# Patient Record
Sex: FEMALE | Race: WHITE | NOT HISPANIC OR LATINO | URBAN - METROPOLITAN AREA
[De-identification: names, ages, dates, MRNs, and addresses within clinical notes are randomized per-mention and may not be internally consistent; named-entity substitution may affect disease eponyms.]

---

## 2018-02-08 ENCOUNTER — EMERGENCY (EMERGENCY)
Facility: HOSPITAL | Age: 38
LOS: 0 days | Discharge: HOME | End: 2018-02-08
Attending: EMERGENCY MEDICINE

## 2018-02-08 VITALS
HEART RATE: 81 BPM | RESPIRATION RATE: 18 BRPM | DIASTOLIC BLOOD PRESSURE: 62 MMHG | SYSTOLIC BLOOD PRESSURE: 133 MMHG | OXYGEN SATURATION: 99 % | TEMPERATURE: 98 F

## 2018-02-08 DIAGNOSIS — Z02.9 ENCOUNTER FOR ADMINISTRATIVE EXAMINATIONS, UNSPECIFIED: ICD-10-CM

## 2018-02-08 DIAGNOSIS — K04.7 PERIAPICAL ABSCESS WITHOUT SINUS: ICD-10-CM

## 2018-02-08 RX ORDER — KETOROLAC TROMETHAMINE 30 MG/ML
30 SYRINGE (ML) INJECTION ONCE
Qty: 0 | Refills: 0 | Status: DISCONTINUED | OUTPATIENT
Start: 2018-02-08 | End: 2018-02-08

## 2018-02-08 RX ADMIN — Medication 30 MILLIGRAM(S): at 21:42

## 2018-02-08 RX ADMIN — Medication 1 TABLET(S): at 22:56

## 2018-02-08 NOTE — ED PROVIDER NOTE - ATTENDING CONTRIBUTION TO CARE
37 yr F with hx of CVA, seizure d/o with complaints of left facial pain and swelling for several days. Pt with hx of dental caries. On exam + swelling of the left face with fluctuance of the left lower molar teeth. Will call dental for management of a likely dental abscess and dispo accordingly. I personally evaluated the patient. I reviewed the Resident’s or Physician Assistant’s note (as assigned above), and agree with the findings and plan except as documented in my note.  37 yr F with hx of CVA, seizure d/o with complaints of left facial pain and swelling for several days. Pt with hx of dental caries. On exam + swelling of the left face with fluctuance of the left lower molar teeth. Will call dental for management of a likely dental abscess and dispo accordingly.

## 2018-02-08 NOTE — CONSULT NOTE ADULT - ASSESSMENT
upper left infiltration with mepivacaine and septocaine for area of superior alveolar nerve and posterior and anterior.  incision and drainage completed. blood and pus came thru the the buccal fold pocket .  Drain sutured in place with silk 3-0.   patient comfortable most of the time.

## 2018-02-08 NOTE — CONSULT NOTE ADULT - SUBJECTIVE AND OBJECTIVE BOX
Patient presented with moderate swelling upper left area of #11 and has several missing teeth posterior to this one.   slight pain started yesterday and day before ; today at morning hour she wake up with slight swelling and during the day has progressed.   no fever, no diziness, slight headaches.

## 2018-02-08 NOTE — ED PROVIDER NOTE - NS ED ROS FT
Eyes:  No visual changes, eye pain or discharge.  ENMT:  + dental pain  Cardiac:  No chest pain, SOB or edema. No chest pain with exertion.  Respiratory:  No cough or respiratory distress. No hemoptysis. No history of asthma or RAD.  MS:  No myalgia, muscle weakness, joint pain or back pain.  Neuro:  No headache or weakness.  No LOC.  Skin:  No skin rash.   Endocrine: No history of thyroid disease or diabetes.  Except as documented in the HPI,  all other systems are negative.

## 2018-02-08 NOTE — ED PROVIDER NOTE - OBJECTIVE STATEMENT
Pt is a 38y/o female presents today c/o left sided dental pain with associated elft sided facial swelling x 1 day. Pt denies drooling, difficulty swallowing, SOB, CP, Fever, chills.

## 2018-02-08 NOTE — CONSULT NOTE ADULT - PROBLEM SELECTOR RECOMMENDATION 9
recommended : augmenting and naproxen prescription, soft and cold diet  must return to dental clinic tomorrow at 8:30 .

## 2018-02-08 NOTE — ED PROVIDER NOTE - MEDICAL DECISION MAKING DETAILS
pt has dental abscess startedon augmentin and give naproxen for pain relief, instructed to f/u with dentla clinic tomorrow

## 2018-02-08 NOTE — ED PROVIDER NOTE - PROGRESS NOTE DETAILS
dental aware and will see pt dental resident drained abscess and placed a drain. Pt to see them in the clinic in the am. Pt will be started on Augmentin. seen by dental, drained place, pt told to fu with dental tomorrow, augmentin and naproxen will be sent to pharmacy

## 2018-02-08 NOTE — ED PROVIDER NOTE - PHYSICAL EXAMINATION
VITAL SIGNS: I have reviewed nursing notes and confirm.  CONSTITUTIONAL: Well-developed; well-nourished; in no acute distress.   SKIN:  skin exam is warm and dry, no acute rash.    HEAD: Normocephalic; atraumatic.  EYES: conjunctiva and sclera clear.  ENT: left sided facial swelling, TTP tooth #11, no sublingual swelling, no airway obstruction No nasal discharge; airway clear.  NECK: Supple; non tender.  CARD: S1, S2 normal; no murmurs, gallops, or rubs. Regular rate and rhythm.   RESP: No wheezes, rales or rhonchi.  EXT: Normal ROM.  No clubbing, cyanosis or edema.   LYMPH: No acute cervical adenopathy.  NEURO: Alert, oriented, grossly unremarkable

## 2018-02-09 ENCOUNTER — OUTPATIENT (OUTPATIENT)
Dept: OUTPATIENT SERVICES | Facility: HOSPITAL | Age: 38
LOS: 1 days | Discharge: HOME | End: 2018-02-09

## 2018-02-09 ENCOUNTER — INPATIENT (INPATIENT)
Facility: HOSPITAL | Age: 38
LOS: 2 days | Discharge: HOME | End: 2018-02-12
Attending: HOSPITALIST

## 2018-02-09 VITALS
TEMPERATURE: 98 F | OXYGEN SATURATION: 98 % | HEART RATE: 726 BPM | DIASTOLIC BLOOD PRESSURE: 81 MMHG | SYSTOLIC BLOOD PRESSURE: 136 MMHG | RESPIRATION RATE: 20 BRPM

## 2018-02-09 DIAGNOSIS — K04.7 PERIAPICAL ABSCESS WITHOUT SINUS: ICD-10-CM

## 2018-02-09 DIAGNOSIS — K02.53 DENTAL CARIES ON PIT AND FISSURE SURFACE PENETRATING INTO PULP: ICD-10-CM

## 2018-02-09 DIAGNOSIS — Z98.51 TUBAL LIGATION STATUS: Chronic | ICD-10-CM

## 2018-02-09 DIAGNOSIS — Z98.890 OTHER SPECIFIED POSTPROCEDURAL STATES: Chronic | ICD-10-CM

## 2018-02-09 LAB
ANION GAP SERPL CALC-SCNC: 8 MMOL/L — SIGNIFICANT CHANGE UP (ref 7–14)
BUN SERPL-MCNC: 6 MG/DL — LOW (ref 10–20)
CALCIUM SERPL-MCNC: 9.7 MG/DL — SIGNIFICANT CHANGE UP (ref 8.5–10.1)
CHLORIDE SERPL-SCNC: 106 MMOL/L — SIGNIFICANT CHANGE UP (ref 98–110)
CO2 SERPL-SCNC: 28 MMOL/L — SIGNIFICANT CHANGE UP (ref 17–32)
CREAT SERPL-MCNC: 0.8 MG/DL — SIGNIFICANT CHANGE UP (ref 0.7–1.5)
GLUCOSE SERPL-MCNC: 86 MG/DL — SIGNIFICANT CHANGE UP (ref 70–110)
HCT VFR BLD CALC: 40.6 % — SIGNIFICANT CHANGE UP (ref 37–47)
HGB BLD-MCNC: 13.6 G/DL — LOW (ref 14–18)
LACTATE SERPL-SCNC: 2 MMOL/L — SIGNIFICANT CHANGE UP (ref 0.5–2.2)
MCHC RBC-ENTMCNC: 31.4 PG — HIGH (ref 27–31)
MCHC RBC-ENTMCNC: 33.5 G/DL — SIGNIFICANT CHANGE UP (ref 32–37)
MCV RBC AUTO: 93.8 FL — HIGH (ref 81–91)
NRBC # BLD: 0 /100 WBCS — SIGNIFICANT CHANGE UP (ref 0–0)
PLATELET # BLD AUTO: 220 K/UL — SIGNIFICANT CHANGE UP (ref 130–400)
POTASSIUM SERPL-MCNC: 4.4 MMOL/L — SIGNIFICANT CHANGE UP (ref 3.5–5)
POTASSIUM SERPL-SCNC: 4.4 MMOL/L — SIGNIFICANT CHANGE UP (ref 3.5–5)
RBC # BLD: 4.33 M/UL — SIGNIFICANT CHANGE UP (ref 4.2–5.4)
RBC # FLD: 11.9 % — SIGNIFICANT CHANGE UP (ref 11.5–14.5)
SODIUM SERPL-SCNC: 142 MMOL/L — SIGNIFICANT CHANGE UP (ref 135–146)
WBC # BLD: 7.72 K/UL — SIGNIFICANT CHANGE UP (ref 4.8–10.8)
WBC # FLD AUTO: 7.72 K/UL — SIGNIFICANT CHANGE UP (ref 4.8–10.8)

## 2018-02-09 RX ORDER — AMPICILLIN SODIUM AND SULBACTAM SODIUM 250; 125 MG/ML; MG/ML
3 INJECTION, POWDER, FOR SUSPENSION INTRAMUSCULAR; INTRAVENOUS ONCE
Qty: 0 | Refills: 0 | Status: COMPLETED | OUTPATIENT
Start: 2018-02-09 | End: 2018-02-09

## 2018-02-09 RX ORDER — ALBUTEROL 90 UG/1
2 AEROSOL, METERED ORAL EVERY 6 HOURS
Qty: 0 | Refills: 0 | Status: DISCONTINUED | OUTPATIENT
Start: 2018-02-09 | End: 2018-02-12

## 2018-02-09 RX ORDER — AMPICILLIN SODIUM AND SULBACTAM SODIUM 250; 125 MG/ML; MG/ML
INJECTION, POWDER, FOR SUSPENSION INTRAMUSCULAR; INTRAVENOUS
Qty: 0 | Refills: 0 | Status: DISCONTINUED | OUTPATIENT
Start: 2018-02-09 | End: 2018-02-12

## 2018-02-09 RX ORDER — MORPHINE SULFATE 50 MG/1
4 CAPSULE, EXTENDED RELEASE ORAL ONCE
Qty: 0 | Refills: 0 | Status: DISCONTINUED | OUTPATIENT
Start: 2018-02-09 | End: 2018-02-09

## 2018-02-09 RX ORDER — SODIUM CHLORIDE 9 MG/ML
1000 INJECTION, SOLUTION INTRAVENOUS
Qty: 0 | Refills: 0 | Status: DISCONTINUED | OUTPATIENT
Start: 2018-02-09 | End: 2018-02-12

## 2018-02-09 RX ORDER — AMPICILLIN SODIUM AND SULBACTAM SODIUM 250; 125 MG/ML; MG/ML
3 INJECTION, POWDER, FOR SUSPENSION INTRAMUSCULAR; INTRAVENOUS EVERY 6 HOURS
Qty: 0 | Refills: 0 | Status: DISCONTINUED | OUTPATIENT
Start: 2018-02-10 | End: 2018-02-12

## 2018-02-09 RX ORDER — SODIUM CHLORIDE 9 MG/ML
1000 INJECTION INTRAMUSCULAR; INTRAVENOUS; SUBCUTANEOUS
Qty: 0 | Refills: 0 | Status: DISCONTINUED | OUTPATIENT
Start: 2018-02-09 | End: 2018-02-09

## 2018-02-09 RX ORDER — ALBUTEROL 90 UG/1
2 AEROSOL, METERED ORAL
Qty: 0 | Refills: 0 | COMMUNITY

## 2018-02-09 RX ORDER — IBUPROFEN 200 MG
400 TABLET ORAL EVERY 6 HOURS
Qty: 0 | Refills: 0 | Status: DISCONTINUED | OUTPATIENT
Start: 2018-02-09 | End: 2018-02-12

## 2018-02-09 RX ADMIN — AMPICILLIN SODIUM AND SULBACTAM SODIUM 200 GRAM(S): 250; 125 INJECTION, POWDER, FOR SUSPENSION INTRAMUSCULAR; INTRAVENOUS at 15:56

## 2018-02-09 RX ADMIN — AMPICILLIN SODIUM AND SULBACTAM SODIUM 200 GRAM(S): 250; 125 INJECTION, POWDER, FOR SUSPENSION INTRAMUSCULAR; INTRAVENOUS at 21:49

## 2018-02-09 RX ADMIN — MORPHINE SULFATE 4 MILLIGRAM(S): 50 CAPSULE, EXTENDED RELEASE ORAL at 16:01

## 2018-02-09 RX ADMIN — SODIUM CHLORIDE 500 MILLILITER(S): 9 INJECTION INTRAMUSCULAR; INTRAVENOUS; SUBCUTANEOUS at 15:45

## 2018-02-09 RX ADMIN — SODIUM CHLORIDE 75 MILLILITER(S): 9 INJECTION, SOLUTION INTRAVENOUS at 23:48

## 2018-02-09 RX ADMIN — AMPICILLIN SODIUM AND SULBACTAM SODIUM 200 GRAM(S): 250; 125 INJECTION, POWDER, FOR SUSPENSION INTRAMUSCULAR; INTRAVENOUS at 23:47

## 2018-02-09 RX ADMIN — MORPHINE SULFATE 4 MILLIGRAM(S): 50 CAPSULE, EXTENDED RELEASE ORAL at 16:30

## 2018-02-09 RX ADMIN — SODIUM CHLORIDE 500 MILLILITER(S): 9 INJECTION INTRAMUSCULAR; INTRAVENOUS; SUBCUTANEOUS at 15:40

## 2018-02-09 NOTE — H&P ADULT - ATTENDING COMMENTS
I saw and examined the patient at bedside.   She is 38 yo female with PMH of Asthma and Seizure, she started with left facial swelling and pain for two days, also she had dental pain, she went to her dentist and she had one tooth extraction, she was started on oral antibiotics but yesterday she woke up and found the swelling around her left eye so she came to Ed, she denies fever, no trauma, no nausea or vomiting, no double vision, no headache or seizure. in the ED her vital signs were stable, labs unremarkable, patient was admitted for dental abscess for IV antibiotics,    Physical exam:   alert oriented x 3 in no distress  VS noted  HEENT: left facial edema, swelling and tenderness with periorbital edema in left side, no collection.   Neck: no JVD  Chest; clear, no rales or wheezing  Heart: RRR s1 s2 no murmurs.   Abdomen: soft, non-tender. no megalies.   ext: no edema    A/P:   Dental abscess: didn't respond to oral antibiotics.   risk of infection expansion to orbital space.   continue Unasyn IV 3.5g q 6hrs  Oral surgery consult.   blood culture   monitor for worsening symptoms, may need orbital CT to rule out any abbesses or collection. ( less likely now, no fever, leukocytosis) I saw and examined the patient at bedside.   She is 38 yo female with PMH of Asthma and Seizure, she started with left facial swelling and pain for two days, also she had dental pain, she woke up and found the swelling around her left eye so she came to Ed, she was discharged on oral antibiotics, then she visited her dentist today and she had the upper 1st molar extraction and referred to ED for IV antibiotics , she denies fever, no trauma, no nausea or vomiting, no double vision, no headache or seizure. in the ED her vital signs were stable, labs unremarkable, patient was admitted for dental abscess for IV antibiotics,    Physical exam:   alert oriented x 3 in no distress  VS noted  HEENT: left facial edema, swelling and tenderness with periorbital edema in left side, no collection.   Neck: no JVD  Chest; clear, no rales or wheezing  Heart: RRR s1 s2 no murmurs.   Abdomen: soft, non-tender. no megalies.   ext: no edema    A/P:   Dental abscess: didn't respond to oral antibiotics.   risk of infection expansion to orbital space.   continue Unasyn IV 3.5g q 6hrs  Oral surgery consult.   blood culture   monitor for worsening symptoms, may need orbital CT to rule out any abbesses or collection. ( less likely now, no fever, leukocytosis) I saw and examined the patient at bedside.   She is 36 yo female with PMH of Asthma and Seizure, she started with left facial swelling and pain for two days, also she had dental pain, she woke up and found the swelling around her left eye so she came to Ed, she was discharged on oral antibiotics, then she visited her dentist today and she had the upper 1st molar extraction and referred to ED for IV antibiotics , she denies fever, no trauma, no nausea or vomiting, no double vision, no headache or seizure. in the ED her vital signs were stable, labs unremarkable, patient was admitted for dental abscess for IV antibiotics,    Physical exam:   alert oriented x 3 in no distress  VS noted  HEENT: left facial edema, swelling and tenderness with periorbital edema in left side, no collection.   Neck: no JVD  Chest; clear, no rales or wheezing  Heart: RRR s1 s2 no murmurs.   Abdomen: soft, non-tender. no megalies.   ext: no edema    A/P:   Dental abscess: didn't respond to oral antibiotics.   risk of infection expansion to orbital space.   continue Unasyn IV 3.5g q 6hrs  Oral surgery consult.   blood culture   monitor for worsening symptoms, may need orbital CT to rule out any abbesses or collection. ( less likely now, no fever, leukocytosis)  Dr. Olivares :   Patient was evaluated and examined by bedside, All labs, radiology studies, VS was reviewed  I agree with medical plan outlined by Medical Attending as stated above.

## 2018-02-09 NOTE — H&P ADULT - NSHPPHYSICALEXAM_GEN_ALL_CORE
Patient is not in respiratory distress, protecting her airways, non-septic.    HEENT: +ve left facial swelling, periorbital swelling, +ve tenderness, +ve palpable LNs  poor oral hygiene, incision of the left upper gingiva(no bleeding). no pharyngeal erythema or exudate of the tonsils.  no conjuctival erythema, or purulent drainage.    Lungs: CTA B/L  CV: RRR, no Rubs, Murmurs, or Gallops.  Abdomen: Soft, NT,ND.  LE: no pitting edema or erythema.  Neuro : AAox3, left hand(weakness no change as / patient), PERRLA.

## 2018-02-09 NOTE — H&P ADULT - PROBLEM SELECTOR PLAN 1
S/p I&D: Oral care, pain control (hurricane soln/naproxen).  iv unasyn.   dental revaluation/ f/u as outpt.

## 2018-02-09 NOTE — ED PROVIDER NOTE - OBJECTIVE STATEMENT
37 female here for dental pain and abscess brought to ED by Dental clinic after I&D of her left teeth today. Has symptoms x seeral days, was in the ED yesterady for same, now worse. Radiation to left face. No provoking / palliative factors. 37 female here for dental pain and abscess brought to ED by Dental clinic after I&D of her left teeth today. Has symptoms x several days, was in the ED yesterday for same, now worse. Radiation to left face. No provoking / palliative factors.

## 2018-02-09 NOTE — H&P ADULT - HISTORY OF PRESENT ILLNESS
37 years-old lady with pmhx as listed came in with left facial swelling that started 2 days ago. Patient facial swelling is associated with mild facial pain, orbital swelling and dental pain. patient came in to the ED today and had an I&D in the dental clinic of her left upper 1st molar tooth and was sent for IV abx. patient denied any nausea, vomiting, fever, rigors, SOB, chills, dysphagia, odynophagia, sore throat, drooling, hoarseness of voice, neck pain, cough, recent head trauma. patient came in to the ED yesterday and was discharged on amoxiclav/ naproxen

## 2018-02-09 NOTE — H&P ADULT - ASSESSMENT
37 years-old came in with left facial swelling of 2 days duration likely 2ry to dental infection/abscess of left upper molar tooth s/p I&D in the dental clinic today

## 2018-02-09 NOTE — ED PROVIDER NOTE - PHYSICAL EXAMINATION
HEENT: left facial swelling noted with periorbital edema. Left jaw and left nare swollen. Left lip swollen.

## 2018-02-09 NOTE — H&P ADULT - NSHPLABSRESULTS_GEN_ALL_CORE
13.6   7.72  )-----------( 220      ( 09 Feb 2018 15:16 )             40.6           LFTs: 02-09-18 @ 15:16  Ca  9.7  | -- AST   -----------------  TP  --  | -- ALT  -----------------  Alb --  | -- ALK          ^        --         TB      BMP: 02-09-18 @ 15:16  142 | 106 | 6    -----------------< 86  4.4  | 28 | 0.8  eGFR(AA): 112, eGFR (non-AA): 97  Ca 9.7, Mg --, P --

## 2018-02-09 NOTE — ED PROVIDER NOTE - NS ED ROS FT
Review of Systems:  	•	CONSTITUTIONAL - no fever, no diaphoresis, no weight change  	•	SKIN - no rash  	•	EYES - no eye pain, no blurred vision + facial swelling.   	•	ENT - no change in hearing, + left facial swelling.   	•	RESPIRATORY - no shortness of breath, no cough  	•	CARDIAC - no chest pain, no palpitations  	•	GI - no abd pain, no nausea, no vomiting  	•	MUSCULOSKELETAL - no joint pain  	•	NEUROLOGIC - no weakness, no headache, no anesthesia, no paresthesias  	•	PSYCH - no anxiety, non suicidal, non homicidal

## 2018-02-10 RX ORDER — OXYCODONE AND ACETAMINOPHEN 5; 325 MG/1; MG/1
1 TABLET ORAL EVERY 6 HOURS
Qty: 0 | Refills: 0 | Status: DISCONTINUED | OUTPATIENT
Start: 2018-02-10 | End: 2018-02-12

## 2018-02-10 RX ADMIN — Medication 400 MILLIGRAM(S): at 12:57

## 2018-02-10 RX ADMIN — OXYCODONE AND ACETAMINOPHEN 1 TABLET(S): 5; 325 TABLET ORAL at 22:43

## 2018-02-10 RX ADMIN — OXYCODONE AND ACETAMINOPHEN 1 TABLET(S): 5; 325 TABLET ORAL at 23:00

## 2018-02-10 RX ADMIN — AMPICILLIN SODIUM AND SULBACTAM SODIUM 200 GRAM(S): 250; 125 INJECTION, POWDER, FOR SUSPENSION INTRAMUSCULAR; INTRAVENOUS at 17:27

## 2018-02-10 RX ADMIN — OXYCODONE AND ACETAMINOPHEN 1 TABLET(S): 5; 325 TABLET ORAL at 15:32

## 2018-02-10 RX ADMIN — Medication 400 MILLIGRAM(S): at 12:05

## 2018-02-10 RX ADMIN — AMPICILLIN SODIUM AND SULBACTAM SODIUM 200 GRAM(S): 250; 125 INJECTION, POWDER, FOR SUSPENSION INTRAMUSCULAR; INTRAVENOUS at 05:02

## 2018-02-10 RX ADMIN — OXYCODONE AND ACETAMINOPHEN 1 TABLET(S): 5; 325 TABLET ORAL at 17:24

## 2018-02-10 RX ADMIN — AMPICILLIN SODIUM AND SULBACTAM SODIUM 200 GRAM(S): 250; 125 INJECTION, POWDER, FOR SUSPENSION INTRAMUSCULAR; INTRAVENOUS at 12:55

## 2018-02-10 RX ADMIN — SODIUM CHLORIDE 75 MILLILITER(S): 9 INJECTION, SOLUTION INTRAVENOUS at 22:37

## 2018-02-11 RX ADMIN — AMPICILLIN SODIUM AND SULBACTAM SODIUM 200 GRAM(S): 250; 125 INJECTION, POWDER, FOR SUSPENSION INTRAMUSCULAR; INTRAVENOUS at 17:57

## 2018-02-11 RX ADMIN — SODIUM CHLORIDE 75 MILLILITER(S): 9 INJECTION, SOLUTION INTRAVENOUS at 14:33

## 2018-02-11 RX ADMIN — AMPICILLIN SODIUM AND SULBACTAM SODIUM 200 GRAM(S): 250; 125 INJECTION, POWDER, FOR SUSPENSION INTRAMUSCULAR; INTRAVENOUS at 00:36

## 2018-02-11 RX ADMIN — OXYCODONE AND ACETAMINOPHEN 1 TABLET(S): 5; 325 TABLET ORAL at 21:30

## 2018-02-11 RX ADMIN — OXYCODONE AND ACETAMINOPHEN 1 TABLET(S): 5; 325 TABLET ORAL at 20:56

## 2018-02-11 RX ADMIN — OXYCODONE AND ACETAMINOPHEN 1 TABLET(S): 5; 325 TABLET ORAL at 16:40

## 2018-02-11 RX ADMIN — AMPICILLIN SODIUM AND SULBACTAM SODIUM 200 GRAM(S): 250; 125 INJECTION, POWDER, FOR SUSPENSION INTRAMUSCULAR; INTRAVENOUS at 13:40

## 2018-02-11 RX ADMIN — AMPICILLIN SODIUM AND SULBACTAM SODIUM 200 GRAM(S): 250; 125 INJECTION, POWDER, FOR SUSPENSION INTRAMUSCULAR; INTRAVENOUS at 06:47

## 2018-02-11 RX ADMIN — OXYCODONE AND ACETAMINOPHEN 1 TABLET(S): 5; 325 TABLET ORAL at 13:39

## 2018-02-11 NOTE — CONSULT NOTE ADULT - ASSESSMENT
Patient was presented to emergency room Thursday night with a large abscess swelling of the upper left side that occurred over night with numbness and difficulty opening her left eye. The on call dental resident Thursday performed an I and D and placed a drain. Patient was discharged and patient came to the dental clinic as an emergency Friday. The drain was removed, an emergency exam was done, and the infected tooth was extracted. Patient was admitted for monitoring due to the extent of the infection and swelling. Today, clinically the patient's swelling has improved greatly since Friday and extraction site is healing well. Face and eye is almost back to normal with little sign of swelling. There were no abnormal findings or signs of new infection. Patient states no pain, no fever, no more numbness, or any new problems since the extraction. Patient was presented to emergency room Thursday night with a large abscess swelling of the upper left side that occurred over night with numbness and difficulty opening her left eye. The on call dental resident Thursday performed an I and D and placed a drain on the upper left buccal gingiva. Patient was discharged and patient came to the dental clinic as an emergency Friday. The drain was removed, an emergency exam was done, and the infected tooth of the upper left was extracted. Patient was admitted for IV antibiotics and monitoring due to the extent of the infection and swelling. Today, clinically the patient's swelling has improved greatly since Friday and extraction site is healing well. Face and eye is nearly back to normal with little sign of swelling. There were no abnormal findings or signs of new infection. Patient states no pain, no fever, no more numbness, or any new problems since the extraction.

## 2018-02-11 NOTE — PROGRESS NOTE ADULT - ATTENDING COMMENTS
Patient was evaluated and examined by bedside,  All labs, radiology studies, VS was reviewed  I agree with medical plan outlined by Medical Attending as stated above.

## 2018-02-11 NOTE — PROGRESS NOTE ADULT - ASSESSMENT
37 F w/ PMH of Asthma and Seizure, p/w L facial swelling and pain for two days, also she had dental pain, she woke up and found the swelling around her left eye so she came to Ed, she was discharged on oral antibiotics, then she visited her dentist on 2/9 and had the upper 1st molar extraction and referred to ED for IV antibiotics , she was admitted for dental abscess for IV antibiotics.
Dental abscess: improved  seen by dentisi   possible discharge home tomorrow with oral Augmentin.

## 2018-02-11 NOTE — CONSULT NOTE ADULT - PROBLEM SELECTOR RECOMMENDATION 9
Continue antibiotics and discharge with amoxicillin 500 mg every 8 hours for 7 days.  Comprehensive exam with private dentist as an outpatient. Continue antibiotics.  Comprehensive exam with private dentist as an outpatient.

## 2018-02-11 NOTE — PROGRESS NOTE ADULT - SUBJECTIVE AND OBJECTIVE BOX
CAROLYN WEAVER  37y  Female      Patient is a 37y old  Female who presents with a chief complaint of Lt jaw clinic  abscess (09 Feb 2018 19:54)      INTERVAL HPI/OVERNIGHT EVENTS:  patient feels much better, her facial swelling has improved, no fever.     Vital Signs Last 24 Hrs  T(C): 36.8 (11 Feb 2018 05:29), Max: 36.8 (11 Feb 2018 05:29)  T(F): 98.2 (11 Feb 2018 05:29), Max: 98.2 (11 Feb 2018 05:29)  HR: 58 (11 Feb 2018 05:29) (57 - 58)  BP: 106/55 (11 Feb 2018 05:29) (106/55 - 111/53)  BP(mean): --  RR: 16 (11 Feb 2018 05:29) (16 - 17)  SpO2: --            Consultant(s) Notes Reviewed:  [x ] YES  [ ] NO          MEDICATIONS  (STANDING):  ampicillin/sulbactam  IVPB 3 Gram(s) IV Intermittent every 6 hours  ampicillin/sulbactam  IVPB      lactated ringers. 1000 milliLiter(s) (75 mL/Hr) IV Continuous <Continuous>    MEDICATIONS  (PRN):  ALBUTerol    90 MICROgram(s) HFA Inhaler 2 Puff(s) Inhalation every 6 hours PRN Bronchospasm  ibuprofen  Tablet 400 milliGRAM(s) Oral every 6 hours PRN moderate pain  oxyCODONE    5 mG/acetaminophen 325 mG 1 Tablet(s) Oral every 6 hours PRN Severe Pain (7 - 10)      LABS                          13.6   7.72  )-----------( 220      ( 09 Feb 2018 15:16 )             40.6     02-09    142  |  106  |  6<L>  ----------------------------<  86  4.4   |  28  |  0.8    Ca    9.7      09 Feb 2018 15:16            Lactate Trend  02-09 @ 15:16 Lactate:2.0         CAPILLARY BLOOD GLUCOSE            RADIOLOGY & ADDITIONAL TESTS:    Imaging Personally Reviewed:  [ ] YES  [ ] NO    HEALTH ISSUES - PROBLEM Dx:  Dental abscess: Dental abscess
SUBJECTIVE:    Patient is a 37y old Female who presents with a chief complaint of Lt jaw abscess  Currently admitted to medicine with the primary diagnosis of Dental abscess     Today is hospital day 1d. This morning she is resting comfortably in bed and complains of pain, and that ibuprofen isn't controlling the pain adequately.    PAST MEDICAL & SURGICAL HISTORY  Stroke:   Asthma  Seizure  H/O tubal ligation  Delivered by  section  H/O hernia repair    SOCIAL HISTORY:  Negative for smoking/alcohol/drug use.     ALLERGIES:  Neurontin (Hives; Urticaria)  Tegretol (Hives; Urticaria)    MEDICATIONS:  STANDING MEDICATIONS  ampicillin/sulbactam  IVPB 3 Gram(s) IV Intermittent every 6 hours  ampicillin/sulbactam  IVPB      lactated ringers. 1000 milliLiter(s) IV Continuous <Continuous>    PRN MEDICATIONS  ALBUTerol    90 MICROgram(s) HFA Inhaler 2 Puff(s) Inhalation every 6 hours PRN  ibuprofen  Tablet 400 milliGRAM(s) Oral every 6 hours PRN  oxyCODONE    5 mG/acetaminophen 325 mG 1 Tablet(s) Oral every 6 hours PRN    VITALS:   T(F): 97.6  HR: 54  BP: 119/61  RR: 18  SpO2: --    LABS:                        13.6   7.72  )-----------( 220      ( 2018 15:16 )             40.6     02-    142  |  106  |  6<L>  ----------------------------<  86  4.4   |  28  |  0.8    Ca    9.7      2018 15:16        PHYSICAL EXAM:     HEENT: left facial edema, swelling and tenderness with periorbital edema in left side, no collection.   	           poor oral hygiene, incision of the left upper gingiva (no bleeding). no pharyngeal erythema or exudate of the tonsils.  	           no conjuctival erythema, or purulent drainage.  Lungs: CTA B/L  CV: RRR, no Rubs, Murmurs, or Gallops.  Abdomen: Soft, NT,ND.  LE: no pitting edema or erythema.  Neuro : AAox3, left hand(weakness no change as / patient), PERRLA.

## 2018-02-11 NOTE — CONSULT NOTE ADULT - SUBJECTIVE AND OBJECTIVE BOX
CAROLYN WEAVER, 37y, Female    T(C): 36.8 (18 @ 05:29), Max: 36.8 (18 @ 05:29)  HR: 58 (18 @ 05:29) (54 - 58)  BP: 106/55 (18 @ 05:29) (106/55 - 119/61)  RR: 16 (18 @ 05:29) (16 - 18)  SpO2: --    DENTAL ABSCESS  No h/o HF  No pertinent family history in first degree relatives  Handoff  MEWS Score  Stroke  Asthma  Seizure  Dental abscess  H/O tubal ligation  Delivered by  section  H/O hernia repair    Neurontin (Hives; Urticaria)  Tegretol (Hives; Urticaria)    ALBUTerol    90 MICROgram(s) HFA Inhaler 2 Puff(s) Inhalation every 6 hours PRN  ampicillin/sulbactam  IVPB 3 Gram(s) IV Intermittent every 6 hours  ampicillin/sulbactam  IVPB      ibuprofen  Tablet 400 milliGRAM(s) Oral every 6 hours PRN  lactated ringers. 1000 milliLiter(s) IV Continuous <Continuous>  oxyCODONE    5 mG/acetaminophen 325 mG 1 Tablet(s) Oral every 6 hours PRN

## 2018-02-12 VITALS
TEMPERATURE: 98 F | DIASTOLIC BLOOD PRESSURE: 46 MMHG | HEART RATE: 50 BPM | RESPIRATION RATE: 18 BRPM | SYSTOLIC BLOOD PRESSURE: 104 MMHG

## 2018-02-12 DIAGNOSIS — Z02.9 ENCOUNTER FOR ADMINISTRATIVE EXAMINATIONS, UNSPECIFIED: ICD-10-CM

## 2018-02-12 RX ORDER — CHLORHEXIDINE GLUCONATE 213 G/1000ML
15 SOLUTION TOPICAL
Qty: 473 | Refills: 0 | OUTPATIENT
Start: 2018-02-12 | End: 2018-02-26

## 2018-02-12 RX ADMIN — SODIUM CHLORIDE 75 MILLILITER(S): 9 INJECTION, SOLUTION INTRAVENOUS at 04:59

## 2018-02-12 RX ADMIN — AMPICILLIN SODIUM AND SULBACTAM SODIUM 200 GRAM(S): 250; 125 INJECTION, POWDER, FOR SUSPENSION INTRAMUSCULAR; INTRAVENOUS at 00:34

## 2018-02-12 RX ADMIN — AMPICILLIN SODIUM AND SULBACTAM SODIUM 200 GRAM(S): 250; 125 INJECTION, POWDER, FOR SUSPENSION INTRAMUSCULAR; INTRAVENOUS at 06:27

## 2018-02-12 NOTE — DISCHARGE NOTE ADULT - CARE PROVIDER_API CALL
Jose Dukes (DDS), Dentistry  [address] [place] [address]  Phone: [phone]  Fax: [phone]

## 2018-02-12 NOTE — DISCHARGE NOTE ADULT - HOSPITAL COURSE
Patient was presented to emergency room Thursday night with a large abscess swelling of the upper left side that occurred over night with numbness and difficulty opening her left eye. The on call dental resident Thursday performed an I and D and placed a drain on the upper left buccal gingiva. Patient was discharged and patient came to the dental clinic as an emergency Friday. The drain was removed, an emergency exam was done, and the infected tooth of the upper left was extracted. Patient was then admitted for IV antibiotics and monitoring due to the extent of the infection and swelling. Clinically the patient's swelling has improved greatly since Friday and extraction site is healing well. Face and eye is nearly back to normal with little sign of swelling. There were no abnormal findings or signs of new infection. Patient states no pain, no fever, no more numbness, or any new problems since the extraction. She was instructed to follow up at the dental clinic.

## 2018-02-12 NOTE — DISCHARGE NOTE ADULT - PLAN OF CARE
Resolve infection Take antibiotics as prescribed and follow u at the dental clinic Take antibiotics as prescribed and follow u at the dental clinic at 1pm at Wednesday 2/14/2018.

## 2018-02-12 NOTE — DISCHARGE NOTE ADULT - PATIENT PORTAL LINK FT
You can access the [facility] Patient Portal, offered by [facility], by registering with the following website: [email or web address]

## 2018-02-12 NOTE — DISCHARGE NOTE ADULT - CARE PLAN
Principal Discharge DX:	Dental abscess  Goal:	Resolve infection  Assessment and plan of treatment:	Take antibiotics as prescribed and follow u at the dental clinic Principal Discharge DX:	Dental abscess  Goal:	Resolve infection  Assessment and plan of treatment:	Take antibiotics as prescribed and follow u at the dental clinic at 1pm at Wednesday 2/14/2018.

## 2018-02-12 NOTE — DISCHARGE NOTE ADULT - MEDICATION SUMMARY - MEDICATIONS TO TAKE
I will START or STAY ON the medications listed below when I get home from the hospital:    chlorhexidine 0.12% mucous membrane liquid  -- 15 milliliter(s) by mouth 2 times a day   -- Indication: For Dental abscess    albuterol 90 mcg/inh inhalation aerosol  -- 2 puff(s) inhaled 4 times a day, As Needed  -- Indication: For Asthma    amoxicillin-clavulanate 875 mg-125 mg oral tablet  -- 1 tab(s) by mouth 2 times a day   -- Finish all this medication unless otherwise directed by prescriber.  Take with food or milk.    -- Indication: For Dental abscess

## 2018-02-14 DIAGNOSIS — Z86.73 PERSONAL HISTORY OF TRANSIENT ISCHEMIC ATTACK (TIA), AND CEREBRAL INFARCTION WITHOUT RESIDUAL DEFICITS: ICD-10-CM

## 2018-02-14 DIAGNOSIS — Z79.2 LONG TERM (CURRENT) USE OF ANTIBIOTICS: ICD-10-CM

## 2018-02-14 DIAGNOSIS — M27.2 INFLAMMATORY CONDITIONS OF JAWS: ICD-10-CM

## 2018-02-14 DIAGNOSIS — R22.0 LOCALIZED SWELLING, MASS AND LUMP, HEAD: ICD-10-CM

## 2018-02-14 DIAGNOSIS — K04.7 PERIAPICAL ABSCESS WITHOUT SINUS: ICD-10-CM

## 2018-02-14 DIAGNOSIS — F17.200 NICOTINE DEPENDENCE, UNSPECIFIED, UNCOMPLICATED: ICD-10-CM

## 2018-02-21 DIAGNOSIS — J45.909 UNSPECIFIED ASTHMA, UNCOMPLICATED: ICD-10-CM

## 2018-02-21 DIAGNOSIS — F17.210 NICOTINE DEPENDENCE, CIGARETTES, UNCOMPLICATED: ICD-10-CM

## 2024-03-01 NOTE — ED ADULT NURSE NOTE - NS ED NOTE ABUSE SUSPICION NEGLECT YN
No ;;03/01: staring into space; mumbles words unclear;  shakes head no when asked if she would take oral Ativan for catatonia (explained to the patient); patient familiar to staff from prior admission with similar presentation.   QTc<500;  hcg negative; labs grossly wnl from sending facility; (1433h).     ;;03/01: staring into space; mumbles words unclear;  shakes head no when asked if she would take oral Ativan for catatonia (explained to the patient); patient familiar to staff from prior admission with similar presentation.   QTc<500;  hcg negative; labs grossly wnl from sending facility; (1433h).  Informed by resident that within 30 minutes patient was sitting on bed answering questions of staff.  Need to continue use of Ativan standing and IM as needed for catatonia; consider starting SSRI when more functional.      Current medications LORazepam     Tablet 2 milliGRAM(s) Oral two times a day for catatonia  LORazepam     Tablet 1 milliGRAM(s) Oral every 6 hours PRN for catatonia consider use IM if not eating or posturing for long periods as a emergency,

## 2025-04-08 NOTE — ED PROVIDER NOTE - NS HIV RISK FACTOR YES
FRW Update  4/8/25 FRZYA received a call form patient to say that she was approved for SNAP but it Is not much only $ 24. MARCO also sent a Care Coordination referral to help with other needs.   
Declined
